# Patient Record
Sex: MALE | Race: WHITE
[De-identification: names, ages, dates, MRNs, and addresses within clinical notes are randomized per-mention and may not be internally consistent; named-entity substitution may affect disease eponyms.]

---

## 2019-07-16 ENCOUNTER — HOSPITAL ENCOUNTER (EMERGENCY)
Dept: HOSPITAL 76 - ED | Age: 29
Discharge: HOME | End: 2019-07-16
Payer: COMMERCIAL

## 2019-07-16 VITALS — DIASTOLIC BLOOD PRESSURE: 95 MMHG | SYSTOLIC BLOOD PRESSURE: 157 MMHG
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
## 2019-07-16 DIAGNOSIS — S01.01XA: Primary | ICD-10-CM
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
## 2019-07-16 PROCEDURE — 99281 EMR DPT VST MAYX REQ PHY/QHP: CPT

## 2019-07-16 PROCEDURE — 12001 RPR S/N/AX/GEN/TRNK 2.5CM/<: CPT

## 2019-07-16 NOTE — ED PHYSICIAN DOCUMENTATION
PD HPI HEAD INJURY





- Stated complaint


Stated Complaint: HEAD INJURY





- Chief complaint


Chief Complaint: Trauma Hd/Nk





- History obtained from


History obtained from: Patient





- History of Present Illness


Where head injury occurred: Work ([employer])


Timing - onset: How many hours ago (1.5)


Pain level max: 7


Pain level now: 7


Location of injury: Top


Quality of pain: Pain


Associated symptoms: No: LOC, AMS, Amnesia, Nausea / vomiting, Neck pain, 

Paresthesias, Seizures, Ear drainage, Nasal drainage


Symptoms improve with: Rest


Symptoms worsen with: Palpation, Movement


Recently seen: Not recently seen





Review of Systems


Constitutional: denies: Fever, Chills


Respiratory: denies: Cough


Skin: denies: Rash


Musculoskeletal: denies: Neck pain, Back pain


Neurologic: denies: Headache





PD PAST MEDICAL HISTORY





- Past Medical History


Past Medical History: No


Cardiovascular: None


Respiratory: None


Neuro: None


Endocrine/Autoimmune: None


GI: None


: None


HEENT: None


Psych: None


Musculoskeletal: None


Derm: None





- Past Surgical History


Past Surgical History: No





- Present Medications


Home Medications: 


                                Ambulatory Orders











 Medication  Instructions  Recorded  Confirmed


 


No Known Home Medications  07/16/19 07/16/19














- Allergies


Allergies/Adverse Reactions: 


                                    Allergies











Allergy/AdvReac Type Severity Reaction Status Date / Time


 


No Known Drug Allergies Allergy   Verified 07/16/19 18:51














- Social History


Does the pt smoke?: No


Smoking Status: Never smoker


Does the pt drink ETOH?: No


Does the pt have substance abuse?: No





- Immunizations


Immunizations are current?: Yes





- POLST


Patient has POLST: No





PD ED PE NORMAL





- Vitals


Vital signs reviewed: Yes





- General


General: Alert and oriented X 3, No acute distress





- HEENT


HEENT: PERRL, Moist mucous membranes, Other (2cm laceration L scalp, frontal. )





- Neck


Neck: Supple, no meningeal sign, No bony TTP





- Cardiac


Cardiac: RRR





- Respiratory


Respiratory: No respiratory distress, Clear bilaterally





- Back


Back: No spinal TTP





- Derm


Derm: Warm and dry





- Neuro


Neuro: Alert and oriented X 3, CNs 2-12 intact, No motor deficit, No sensory 

deficit, Normal speech


Eye Opening: Spontaneous


Motor: Obeys Commands


Verbal: Oriented


GCS Score: 15





- Psych


Psych: Normal mood, Normal affect





Results





- Vitals


Vitals: 


                                     Oxygen











O2 Source                      Room air

















Procedures





- Laceration (location)


  ** scalp


Length in cm: 2


Wound type: Linear, Superficial, Clean


Neurovascular status: Sensory intact, Motor intact, Vascular intact


Wound Preparation: Irrigated copiously NS


Skin layer closure: Staples


Other: Patient tolerated well, No complications, Neurovascular intact, Tetanus 

UTD


Complexity: Simple





PD MEDICAL DECISION MAKING





- ED course


Complexity details: considered differential, d/w patient


ED course: 





Patient with a scalp laceration.  Repaired with staples.  Tolerated well.  No 

neurological deficits.  No altered mental status.  No evidence of skull fracture

or intracranial hemorrhage that require intervention.  Head injury instructions 

given at bedside.  Head CT held at this time.  Warnings of infection and inst

ructions on wound care given at bedside.   Patient counseled regarding signs and

symptoms for which I believe and urgent re-evaluation would be necessary. 

Patient with good understanding of and agreement to plan and is comfortable 

going home at this time





This document was made in part using voice recognition software. While efforts 

are made to proofread this document, sound alike and grammatical errors may 

occur.





Departure





- Departure


Disposition: 01 Home, Self Care


Clinical Impression: 


Scalp laceration


Qualifiers:


 Encounter type: initial encounter Qualified Code(s): S01.01XA - Laceration 

without foreign body of scalp, initial encounter





Condition: Good


Instructions:  ED Laceration Scalp Stitch Or Stap


Follow-Up: 


your,doctor in 7-10 days for staple removal [Other]


Comments: 


Keep the wound clean. Staples should be removed in 7-10 days with your doctor. 

Use motrin or tylenol for pain. 


Discharge Date/Time: 07/16/19 19:56